# Patient Record
Sex: MALE | Race: ASIAN | NOT HISPANIC OR LATINO | URBAN - METROPOLITAN AREA
[De-identification: names, ages, dates, MRNs, and addresses within clinical notes are randomized per-mention and may not be internally consistent; named-entity substitution may affect disease eponyms.]

---

## 2022-07-26 ENCOUNTER — EMERGENCY (EMERGENCY)
Facility: HOSPITAL | Age: 37
LOS: 1 days | Discharge: HOME | End: 2022-07-26
Attending: STUDENT IN AN ORGANIZED HEALTH CARE EDUCATION/TRAINING PROGRAM | Admitting: EMERGENCY MEDICINE

## 2022-07-26 VITALS
TEMPERATURE: 98 F | SYSTOLIC BLOOD PRESSURE: 119 MMHG | DIASTOLIC BLOOD PRESSURE: 74 MMHG | RESPIRATION RATE: 14 BRPM | HEART RATE: 82 BPM | OXYGEN SATURATION: 100 %

## 2022-07-26 LAB
ALBUMIN SERPL ELPH-MCNC: 4.7 G/DL — SIGNIFICANT CHANGE UP (ref 3.3–5)
ALP SERPL-CCNC: 99 U/L — SIGNIFICANT CHANGE UP (ref 40–120)
ALT FLD-CCNC: 34 U/L — SIGNIFICANT CHANGE UP (ref 4–41)
ANION GAP SERPL CALC-SCNC: 11 MMOL/L — SIGNIFICANT CHANGE UP (ref 7–14)
APTT BLD: 30.6 SEC — SIGNIFICANT CHANGE UP (ref 27–36.3)
AST SERPL-CCNC: 25 U/L — SIGNIFICANT CHANGE UP (ref 4–40)
BASE EXCESS BLDV CALC-SCNC: 4.7 MMOL/L — HIGH (ref -2–3)
BASOPHILS # BLD AUTO: 0.06 K/UL — SIGNIFICANT CHANGE UP (ref 0–0.2)
BASOPHILS NFR BLD AUTO: 0.7 % — SIGNIFICANT CHANGE UP (ref 0–2)
BILIRUB SERPL-MCNC: 0.3 MG/DL — SIGNIFICANT CHANGE UP (ref 0.2–1.2)
BLOOD GAS VENOUS COMPREHENSIVE RESULT: SIGNIFICANT CHANGE UP
BUN SERPL-MCNC: 10 MG/DL — SIGNIFICANT CHANGE UP (ref 7–23)
CALCIUM SERPL-MCNC: 10.2 MG/DL — SIGNIFICANT CHANGE UP (ref 8.4–10.5)
CHLORIDE BLDV-SCNC: 104 MMOL/L — SIGNIFICANT CHANGE UP (ref 96–108)
CHLORIDE SERPL-SCNC: 102 MMOL/L — SIGNIFICANT CHANGE UP (ref 98–107)
CK SERPL-CCNC: 83 U/L — SIGNIFICANT CHANGE UP (ref 30–200)
CK SERPL-CCNC: 90 U/L — SIGNIFICANT CHANGE UP (ref 30–200)
CO2 BLDV-SCNC: 34.4 MMOL/L — HIGH (ref 22–26)
CO2 SERPL-SCNC: 29 MMOL/L — SIGNIFICANT CHANGE UP (ref 22–31)
CREAT SERPL-MCNC: 0.88 MG/DL — SIGNIFICANT CHANGE UP (ref 0.5–1.3)
D DIMER BLD IA.RAPID-MCNC: <150 NG/ML DDU — SIGNIFICANT CHANGE UP
EGFR: 112 ML/MIN/1.73M2 — SIGNIFICANT CHANGE UP
EOSINOPHIL # BLD AUTO: 0.19 K/UL — SIGNIFICANT CHANGE UP (ref 0–0.5)
EOSINOPHIL NFR BLD AUTO: 2.4 % — SIGNIFICANT CHANGE UP (ref 0–6)
FLUAV AG NPH QL: SIGNIFICANT CHANGE UP
FLUBV AG NPH QL: SIGNIFICANT CHANGE UP
GAS PNL BLDV: 136 MMOL/L — SIGNIFICANT CHANGE UP (ref 136–145)
GLUCOSE BLDV-MCNC: 74 MG/DL — SIGNIFICANT CHANGE UP (ref 70–99)
GLUCOSE SERPL-MCNC: 82 MG/DL — SIGNIFICANT CHANGE UP (ref 70–99)
HCO3 BLDV-SCNC: 33 MMOL/L — HIGH (ref 22–29)
HCT VFR BLD CALC: 54 % — HIGH (ref 39–50)
HCT VFR BLDA CALC: 52 % — HIGH (ref 39–51)
HGB BLD CALC-MCNC: 17.2 G/DL — HIGH (ref 13–17)
HGB BLD-MCNC: 17.6 G/DL — HIGH (ref 13–17)
IANC: 4.9 K/UL — SIGNIFICANT CHANGE UP (ref 1.8–7.4)
IMM GRANULOCYTES NFR BLD AUTO: 0.2 % — SIGNIFICANT CHANGE UP (ref 0–1.5)
INR BLD: 1.06 RATIO — SIGNIFICANT CHANGE UP (ref 0.88–1.16)
LACTATE BLDV-MCNC: 1.9 MMOL/L — SIGNIFICANT CHANGE UP (ref 0.5–2)
LYMPHOCYTES # BLD AUTO: 2.44 K/UL — SIGNIFICANT CHANGE UP (ref 1–3.3)
LYMPHOCYTES # BLD AUTO: 30.4 % — SIGNIFICANT CHANGE UP (ref 13–44)
MAGNESIUM SERPL-MCNC: 2.2 MG/DL — SIGNIFICANT CHANGE UP (ref 1.6–2.6)
MCHC RBC-ENTMCNC: 29 PG — SIGNIFICANT CHANGE UP (ref 27–34)
MCHC RBC-ENTMCNC: 32.6 GM/DL — SIGNIFICANT CHANGE UP (ref 32–36)
MCV RBC AUTO: 89.1 FL — SIGNIFICANT CHANGE UP (ref 80–100)
MONOCYTES # BLD AUTO: 0.42 K/UL — SIGNIFICANT CHANGE UP (ref 0–0.9)
MONOCYTES NFR BLD AUTO: 5.2 % — SIGNIFICANT CHANGE UP (ref 2–14)
NEUTROPHILS # BLD AUTO: 4.9 K/UL — SIGNIFICANT CHANGE UP (ref 1.8–7.4)
NEUTROPHILS NFR BLD AUTO: 61.1 % — SIGNIFICANT CHANGE UP (ref 43–77)
NRBC # BLD: 0 /100 WBCS — SIGNIFICANT CHANGE UP
NRBC # FLD: 0 K/UL — SIGNIFICANT CHANGE UP
NT-PROBNP SERPL-SCNC: <5 PG/ML — SIGNIFICANT CHANGE UP
PCO2 BLDV: 59 MMHG — HIGH (ref 42–55)
PH BLDV: 7.35 — SIGNIFICANT CHANGE UP (ref 7.32–7.43)
PLATELET # BLD AUTO: 264 K/UL — SIGNIFICANT CHANGE UP (ref 150–400)
PO2 BLDV: 31 MMHG — SIGNIFICANT CHANGE UP
POTASSIUM BLDV-SCNC: 4.5 MMOL/L — SIGNIFICANT CHANGE UP (ref 3.5–5.1)
POTASSIUM SERPL-MCNC: 4 MMOL/L — SIGNIFICANT CHANGE UP (ref 3.5–5.3)
POTASSIUM SERPL-SCNC: 4 MMOL/L — SIGNIFICANT CHANGE UP (ref 3.5–5.3)
PROT SERPL-MCNC: 7.8 G/DL — SIGNIFICANT CHANGE UP (ref 6–8.3)
PROTHROM AB SERPL-ACNC: 12.3 SEC — SIGNIFICANT CHANGE UP (ref 10.5–13.4)
RBC # BLD: 6.06 M/UL — HIGH (ref 4.2–5.8)
RBC # FLD: 12.6 % — SIGNIFICANT CHANGE UP (ref 10.3–14.5)
RSV RNA NPH QL NAA+NON-PROBE: SIGNIFICANT CHANGE UP
SAO2 % BLDV: 47.4 % — SIGNIFICANT CHANGE UP
SARS-COV-2 RNA SPEC QL NAA+PROBE: SIGNIFICANT CHANGE UP
SODIUM SERPL-SCNC: 142 MMOL/L — SIGNIFICANT CHANGE UP (ref 135–145)
TROPONIN T, HIGH SENSITIVITY RESULT: <6 NG/L — SIGNIFICANT CHANGE UP
VIT B12 SERPL-MCNC: 645 PG/ML — SIGNIFICANT CHANGE UP (ref 200–900)
WBC # BLD: 8.03 K/UL — SIGNIFICANT CHANGE UP (ref 3.8–10.5)
WBC # FLD AUTO: 8.03 K/UL — SIGNIFICANT CHANGE UP (ref 3.8–10.5)

## 2022-07-26 PROCEDURE — 93010 ELECTROCARDIOGRAM REPORT: CPT

## 2022-07-26 PROCEDURE — 70496 CT ANGIOGRAPHY HEAD: CPT | Mod: 26,MA

## 2022-07-26 PROCEDURE — 70498 CT ANGIOGRAPHY NECK: CPT | Mod: 26,MA

## 2022-07-26 PROCEDURE — 99218: CPT | Mod: 25

## 2022-07-26 PROCEDURE — 71045 X-RAY EXAM CHEST 1 VIEW: CPT | Mod: 26

## 2022-07-26 PROCEDURE — 73502 X-RAY EXAM HIP UNI 2-3 VIEWS: CPT | Mod: 26,RT

## 2022-07-26 PROCEDURE — 72170 X-RAY EXAM OF PELVIS: CPT | Mod: 26,59

## 2022-07-26 PROCEDURE — 72125 CT NECK SPINE W/O DYE: CPT | Mod: 26,MA

## 2022-07-26 RX ORDER — SODIUM CHLORIDE 9 MG/ML
1000 INJECTION INTRAMUSCULAR; INTRAVENOUS; SUBCUTANEOUS
Refills: 0 | Status: DISCONTINUED | OUTPATIENT
Start: 2022-07-26 | End: 2022-07-30

## 2022-07-26 RX ADMIN — SODIUM CHLORIDE 125 MILLILITER(S): 9 INJECTION INTRAMUSCULAR; INTRAVENOUS; SUBCUTANEOUS at 23:05

## 2022-07-26 NOTE — ED ADULT TRIAGE NOTE - CHIEF COMPLAINT QUOTE
Pt arrives ambulatory to triage c/o right foot cramping, SOB, and neck pain since last Saturday. Respirations even & unlabored. Pt appears comfortable. Denies PMH.

## 2022-07-26 NOTE — ED CDU PROVIDER INITIAL DAY NOTE - OBJECTIVE STATEMENT
this is a 37 y.o male with no known PMHx presented to the ED complaining of having right leg weakness since saturday, patient got into a argument with wife. Patient states that he started to have right right lower extremity weakness and numbness, he had difficulties walking along with some weaknewss and he felt as if he couldn't stand on his leg.

## 2022-07-26 NOTE — ED PROVIDER NOTE - CLINICAL SUMMARY MEDICAL DECISION MAKING FREE TEXT BOX
36 y/o M with no known medical problems p/w right leg weakness since sat. Pt states he was in an argument with wife. Pt states he was in an argument and during the event he had headache followed by neck pain and chest pain along with SOB which lasted for 2 hours.  right leg weakness post arugment, pt w/ abnormal strenght in rle . no saddle anesthesia or back pain, will obtain ct cta, cbc, cmp, ekg, trop, d dimer, neuro consult

## 2022-07-26 NOTE — ED CDU PROVIDER INITIAL DAY NOTE - ATTENDING APP SHARED VISIT CONTRIBUTION OF CARE
38 y/o M with no known medical problems p/w right leg weakness since sat. Pt states he was in an argument with wife. Pt states he was in an argument and during the event he had headache followed by neck pain and chest pain along with SOB which lasted for 2 hours during the event. After the event he states he developed cramping in b/l UE and LE followed by persitent weakness in the right leg. pt has been having difficulty with walking due to weakness in his right leg since the event. He feels like he cant stand on his leg well due to weakness and has not been able to walk with the leg straight. He denies chest pain, dizziness, nausea, vomiting. Pt w/ 4/5 strength RLE proximal and distal w/ mild right leg foot drop. CT head negative, seen by neuro placed in cdu for MRI brain and c/t/l spine   denies fever, chills, chest pain, SOB, abdominal pain, diarrhea, dysuria, syncope, bleeding, new rash,weakness, numbness, blurred vision    ROS  otherwise negative as per HPI  Gen: Awake, Alert, WD, WN, NAD  Head:  NC/AT  Eyes:  PERRL, EOMI, Conjunctiva pink, lids normal, no scleral icterus  ENT: OP clear, no exudates,  moist mucus membranes  Neck: supple, nontender, no meningismus, no JVD, trachea midline  Cardiac/CV:  S1 S2, RRR, no M/G/R  Respiratory/Pulm:  CTAB, good air movement, normal resp effort, no wheezes/stridor/retractions/rales/rhonchi  Gastrointestinal/Abdomen:  Soft, nontender, nondistended, +BS, no rebound/guarding  Back:  no CVAT, no MLT  Ext:  warm, well perfused, moving all extremities spontaneously, no peripheral edema, distal pulses intact  Skin: intact, no rash  Neuro:  AAOx3, sensation intact, motor 4/5 x RLE w/ proximal weakness and distal right 3/5 w/ foot drop  speech clear  cdu plan  MRI brain , MRI c/t/l spine w/w/o   neuro eval

## 2022-07-26 NOTE — ED PROVIDER NOTE - ATTENDING CONTRIBUTION TO CARE
Contacted pt and given appt for 4/13. attending wrote note  Dr. Diego: I have personally seen and examined this patient at the bedside. I have fully participated in the care of this patient. I have reviewed all pertinent clinical information, including history, physical exam, plan and the Resident's note and agree except as noted. HPI above as by me. PE above as by me. DDX PLAN

## 2022-07-26 NOTE — ED CDU PROVIDER INITIAL DAY NOTE - NS ED ATTENDING STATEMENT MOD
This was a shared visit with the PANKAJ. I reviewed and verified the documentation and independently performed the documented:

## 2022-07-26 NOTE — ED CDU PROVIDER INITIAL DAY NOTE - MEDICAL DECISION MAKING DETAILS
this is a 37 y.o male with no known PMHx presented to the ED complaining of having right leg weakness since saturday. leg weakness-MRI of head,cervical thoracic and lumbar spine, neurology consult. reassess

## 2022-07-26 NOTE — CONSULT NOTE ADULT - ATTENDING COMMENTS
patient seen and examined at bedside, CN intact, no sensory abnl, DTR equal and symmetric, plantars downgoing  poor effort on strength testing in RLE, lying in bed 3/5, leg drifts, but able to stand and bear weight without difficulty, dragging foot while walking  MRI brain  C/T/L spine with and without marco antonio - unremarkable  Likely conversion disorder, sudden onset of Sx after argumentation with family, unremarkable imaging and non consistent neuro exam  No further inpatient neuro w/u  f/u out pt Dr rothman  D/w patient and ER staff

## 2022-07-26 NOTE — CONSULT NOTE ADULT - SUBJECTIVE AND OBJECTIVE BOX
Neurology - Consult Note - 07-26-22  -  Xiomy Duenas MD  PGY-2 Neurology  Spectra: [id number] ([facility]), [id number] ([facility])  -    Name: MANSOOR MARR; 37y (1985)    Reason for Admission:     Chief Complaint:   HPI:          Review of Systems:    CONSTITUTIONAL: No fevers or chills  EYES/ENT: No visual changes or no throat pain   NECK: No pain or stiffness  RESPIRATORY: No hemoptysis or shortness of breath  CARDIOVASCULAR: No chest pain or palpitations. Patient does endorse palpitations from time to time.  GASTROINTESTINAL: No melena or hematochezia  GENITOURINARY: No dysuria or hematuria  NEUROLOGICAL: +As stated in HPI above  SKIN: No itching, burning, rashes, or lesions   All other review of systems is negative unless indicated above.    Allergies: NKDA  PMHx:   No pertinent past medical history. Patient hasnt been to doctor's for last 5 years.  PFHx: NKFH.  PSuHx: No significant past surgical history        Medications:  MEDICATIONS  (STANDING):    MEDICATIONS  (PRN):      Vitals:  T(C): 36.6 (07-26-22 @ 16:44), Max: 36.6 (07-26-22 @ 16:44)  HR: 82 (07-26-22 @ 16:44) (82 - 82)  BP: 119/74 (07-26-22 @ 16:44) (119/74 - 119/74)  RR: 14 (07-26-22 @ 16:44) (14 - 14)  SpO2: 100% (07-26-22 @ 16:44) (100% - 100%)    Physical Examination:     Constitutional: well-developed, well-nourished, well-groomed  Eyes: ophthalmoscopic exam deferred secondary to COVID-19 pandemic  Cardiovascular: no swelling, warm-to-touch    Neurological Examination:    - Mental Status: Alert, awake, oriented to person, place, and time; speech is fluent with intact naming, repetition, and follows 1-step and 3-step mid-line crossing commands; good overall fund of knowledge;     - Cranial Nerves:  II: Visual fields are full to confrontation; pupils are equal, round, and reactive to light   III, IV, VI: Extraocular movements are intact without nystagmus  V: Facial sensation is intact in the V1-V3 distribution bilaterally  VII: Face is symmetric with normal eye closure and smile  VIII: Hearing is intact to finger rub b/l  IX, X: Uvula is midline and soft palate rises symmetrically  XI: Shoulder shrug intact 5/5  XII: Tongue protrudes in the midline, no fasciculations    - Motor/Strength Testing:                                 Right           Left  Deltoid                     5                 5  Biceps                      5                 5  Triceps                     5                 5  Wrist Ext (radial)       5                 5  Hand                 5                 5    Hip Flex                   3                  5  Knee Ext	      3                  5  Dorsiflex                 3-                  5  Plantarflex               3-                  5    - There is no pronator drift b/l UE. RLE drift   - Normal muscle bulk and tone throughout.    - Reflexes:   Bicep (C5/C6):                  R 2+ --- L 2+   Brachioradialis (C5/C6) :   R 2+ --- L 2+   Patella (L3/L4) :                 R 2+ --- L 2+  R slightly less than L  Ankle (S1) :                       no clonus    - Plant responses down bilaterally.    - Sensory: Intact throughout to light touch, blunted on R shin, dorsal foot, plantar foot, compared to L  - Coordination: Finger-nose-finger intact without ataxia or dysmetria. HTS ok on L, limited by weakness on R.   - Gait: Cautious. R foot everted, R foot doesnt lift high.    Labs:                        17.6   8.03  )-----------( 264      ( 26 Jul 2022 18:20 )             54.0     07-26    142  |  102  |  10  ----------------------------<  82  4.0   |  29  |  0.88    Ca    10.2      26 Jul 2022 18:20  Mg     2.20     07-26    TPro  7.8  /  Alb  4.7  /  TBili  0.3  /  DBili  x   /  AST  25  /  ALT  34  /  AlkPhos  99  07-26    CAPILLARY BLOOD GLUCOSE        LIVER FUNCTIONS - ( 26 Jul 2022 18:20 )  Alb: 4.7 g/dL / Pro: 7.8 g/dL / ALK PHOS: 99 U/L / ALT: 34 U/L / AST: 25 U/L / GGT: x             PT/INR - ( 26 Jul 2022 18:20 )   PT: 12.3 sec;   INR: 1.06 ratio         PTT - ( 26 Jul 2022 18:20 )  PTT:30.6 sec  CSF:                  Radiology:     Neurology - Consult Note - 07-26-22      Name: MANSOOR MARR; 37y (1985)    Reason for Admission: R leg weakness    Chief Complaint: 36 yo M with R leg weakness    HPI: 36 yo M, no known PMH, presents to the ED for R leg "not working." Patient reports he got into a verbal argument with his family on Saturday, during which he suddenly experienced difficulty breathing, chest pain, abdominal pain. He also experienced neck pain and some Right hand stiffness. All of these above symptoms lasted about 2 hours before completely resolving, but patient then noticed weakness of the Right leg, which has since been continuous since Saturday. Patient denied any trauma or injury to the head, back, legs, or elsewhere. Patient denies any pain currently.Patient denies current headache, changes in vision changes in hearing or smell or taste, arm pain, chest pain, palpitations, dyspnea, abdominal pain, leg pain, nausea, vomiting, diarrhea, constipation, changes in urination or bowel habits.        Review of Systems:    CONSTITUTIONAL: No fevers or chills  EYES/ENT: No visual changes or no throat pain   NECK: No pain or stiffness  RESPIRATORY: No hemoptysis or shortness of breath  CARDIOVASCULAR: No chest pain or palpitations. Patient does endorse palpitations from time to time.  GASTROINTESTINAL: No melena or hematochezia  GENITOURINARY: No dysuria or hematuria  NEUROLOGICAL: +As stated in HPI above  SKIN: No itching, burning, rashes, or lesions   All other review of systems is negative unless indicated above.    Allergies: NKDA  PMHx: No Known PMH  Patient hasn't been to doctor's for last 5 years.  No recent travel history.  No recent hospitalizations.   Immunized for COVID x 3 doses    PFHx: NKFH.  PSuHx: No significant past surgical history  Social Hx: No smoking.        Medications:  MEDICATIONS  (STANDING):    MEDICATIONS  (PRN):      Vitals:  T(C): 36.6 (07-26-22 @ 16:44), Max: 36.6 (07-26-22 @ 16:44)  HR: 82 (07-26-22 @ 16:44) (82 - 82)  BP: 119/74 (07-26-22 @ 16:44) (119/74 - 119/74)  RR: 14 (07-26-22 @ 16:44) (14 - 14)  SpO2: 100% (07-26-22 @ 16:44) (100% - 100%)    Physical Examination:   Constitutional: well-developed, well-nourished, well-groomed  Eyes: ophthalmoscopic exam deferred secondary to COVID-19 pandemic  Cardiovascular: no swelling, warm-to-touch    Neurological Examination:    - Mental Status: Alert, awake, oriented to person, place, and time; speech is fluent with intact naming, repetition, and follows 1-step and 3-step mid-line crossing commands; good overall fund of knowledge;     - Cranial Nerves:  II: Visual fields are full to confrontation; pupils are equal, round, and reactive to light   III, IV, VI: Extraocular movements are intact without nystagmus  V: Facial sensation is intact in the V1-V3 distribution bilaterally  VII: Face is symmetric with normal eye closure and smile  VIII: Hearing is intact to finger rub b/l  IX, X: Uvula is midline and soft palate rises symmetrically  XI: Shoulder shrug intact 5/5  XII: Tongue protrudes in the midline, no fasciculations    - Motor/Strength Testing:                                 Right           Left  Deltoid                     5                 5  Biceps                      5                 5  Triceps                     5                 5  Wrist Ext (radial)       5                 5  Hand                 5                 5    Hip Flex                   3                  5  Knee Ext	      3                  5  Dorsiflex                 3-                  5  Patient cannot move against resistance on the R foot  Plantarflex               3-                  5  Patient cannot move against resistance on the R foot    - There is no pronator drift b/l UE. RLE drift   - Normal muscle bulk and tone throughout.    - Reflexes:   Bicep (C5/C6):                  R 2+ --- L 2+   Brachioradialis (C5/C6) :   R 2+ --- L 2+   Patella (L3/L4) :                 R 2+ --- L 2+  R slightly less than L  Ankle (S1) :                       no clonus    - Plant responses down bilaterally.    - Sensory: Intact throughout to light touch, blunted on R shin, dorsal foot, plantar foot, compared to L  - Coordination: Finger-nose-finger intact without ataxia or dysmetria. HTS ok on L, limited by weakness on R.   - Gait: Cautious. R foot everted, R foot leg/doesn't lift high, ?circumduction.     Labs:                        17.6   8.03  )-----------( 264      ( 26 Jul 2022 18:20 )             54.0     07-26    142  |  102  |  10  ----------------------------<  82  4.0   |  29  |  0.88    Ca    10.2      26 Jul 2022 18:20  Mg     2.20     07-26    TPro  7.8  /  Alb  4.7  /  TBili  0.3  /  DBili  x   /  AST  25  /  ALT  34  /  AlkPhos  99  07-26    CAPILLARY BLOOD GLUCOSE        LIVER FUNCTIONS - ( 26 Jul 2022 18:20 )  Alb: 4.7 g/dL / Pro: 7.8 g/dL / ALK PHOS: 99 U/L / ALT: 34 U/L / AST: 25 U/L / GGT: x             PT/INR - ( 26 Jul 2022 18:20 )   PT: 12.3 sec;   INR: 1.06 ratio         PTT - ( 26 Jul 2022 18:20 )  PTT:30.6 sec  CSF:                  Radiology:

## 2022-07-26 NOTE — ED ADULT NURSE NOTE - OBJECTIVE STATEMENT
pt ambulatory from home, accompanied by spouse for onset of right sided pain and weakness since saturday. pt reports most symptoms resolved except for right lower extremity weakness. pt equal strength to upper extremities. right lower extremity is weaker compared to left. pt denies parasthesias. no headache, blurred vision or aphasia at this time. right ac 20g inserted. labs drawn.

## 2022-07-26 NOTE — CONSULT NOTE ADULT - ASSESSMENT
Assessment: 36 yo M, no known PMH, presents to the ED for R leg "not working." Patient reports he got into a verbal argument with his family on Saturday, during which he suddenly experienced difficulty breathing, chest pain, abdominal pain. He also experienced neck pain and some Right hand stiffness. All of these above symptoms lasted about 2 hours before completely resolving, but patient then noticed weakness of the Right leg, which has since been continuous since Saturday. Patient denied any trauma or injury to the head, back, legs, or elsewhere. Patient denies any pain currently.Patient denies current headache, changes in vision changes in hearing or smell or taste, arm pain, chest pain, palpitations, dyspnea, abdominal pain, leg pain, nausea, vomiting, diarrhea, constipation, changes in urination or bowel habits.     Motor/Strength Testing  Patient cannot dorsiflex and plantarflex against resistance on the R foot. RLE drift, patient has decreased leg raise on R.  Patellar reflex: 2 b/l but R slightly less than L. Sensation intact throughout to light touch, but blunted on R shin, dorsal foot, plantar foot, compared to L. HTS ok on L, limited by weakness on R. - Gait: Cautious. R foot everted, R foot leg/doesn't lift high, ?circumduction.     No fractures on imaging.    Impression: Right lower extremity paresis, distal weaker than proximal, without pain/tenderness/paresthesias. Unclear etiology, will need further workup, likely peripheral but will need to evaluate for central/axial causes as well.    Recommendations:  -MRI head, MRI spine (cervical, thoracic, lumbar)  -PT/OT, splinting  -Recommend getting B12, folate, and CPK levels  -Further recommendations pending above workup    Patient will be seen by Neurology attending during AM rounds.  Recommendations not final until attending attestation.

## 2022-07-26 NOTE — ED PROVIDER NOTE - PHYSICAL EXAMINATION
Gen: Awake, Alert, WD, WN, NAD  Head:  NC/AT  Eyes:  PERRL, EOMI, Conjunctiva pink, lids normal, no scleral icterus  ENT: OP clear, no exudates,  moist mucus membranes  Neck: supple, nontender, no meningismus, no JVD, trachea midline  Cardiac/CV:  S1 S2, RRR, no M/G/R  Respiratory/Pulm:  CTAB, good air movement, normal resp effort, no wheezes/stridor/retractions/rales/rhonchi  Gastrointestinal/Abdomen:  Soft, nontender, nondistended, +BS, no rebound/guarding  Back:  no CVAT, no MLT  Ext:  warm, well perfused, moving all extremities spontaneously, no peripheral edema, distal pulses intact  Skin: intact, no rash  Neuro:  AAOx3, sensation intact, motor 4/5 x RLE w/ proximal weakness and distal right 3/5 w/ foot drop  speech clear

## 2022-07-26 NOTE — ED PROVIDER NOTE - OBJECTIVE STATEMENT
36 y/o M with no known medical problems p/w right leg weakness since sat. Pt states he was in an argument 38 y/o M with no known medical problems p/w right leg weakness since sat. Pt states he was in an argument with wife. Pt states he was in an argument and during the event he had headache followed by neck pain and chest pain along with SOB which lasted for 2 hours during the event. After the event he states he developed cramping in b/l UE and LE followed by persitent weakness in the right leg. pt has been having difficulty with walking due to weakness in his right leg since the event. He feels like he cant stand on his leg well due to weakness and has not been able to walk with the leg straight. He denies chest pain, dizziness, nausea, vomiting. Pt w/ 4/5 strength RLE proximal and distal w/ mild right leg foot drop

## 2022-07-27 VITALS
DIASTOLIC BLOOD PRESSURE: 84 MMHG | RESPIRATION RATE: 18 BRPM | HEART RATE: 75 BPM | SYSTOLIC BLOOD PRESSURE: 123 MMHG | TEMPERATURE: 98 F | OXYGEN SATURATION: 100 %

## 2022-07-27 LAB
APPEARANCE UR: CLEAR — SIGNIFICANT CHANGE UP
BILIRUB UR-MCNC: NEGATIVE — SIGNIFICANT CHANGE UP
COLOR SPEC: SIGNIFICANT CHANGE UP
DIFF PNL FLD: NEGATIVE — SIGNIFICANT CHANGE UP
FOLATE SERPL-MCNC: 10.9 NG/ML — SIGNIFICANT CHANGE UP (ref 3.1–17.5)
GLUCOSE UR QL: NEGATIVE — SIGNIFICANT CHANGE UP
KETONES UR-MCNC: NEGATIVE — SIGNIFICANT CHANGE UP
LEUKOCYTE ESTERASE UR-ACNC: NEGATIVE — SIGNIFICANT CHANGE UP
NITRITE UR-MCNC: NEGATIVE — SIGNIFICANT CHANGE UP
PH UR: 7 — SIGNIFICANT CHANGE UP (ref 5–8)
PROT UR-MCNC: ABNORMAL
RBC CASTS # UR COMP ASSIST: SIGNIFICANT CHANGE UP /HPF (ref 0–4)
SP GR SPEC: 1.05 — SIGNIFICANT CHANGE UP (ref 1–1.05)
T PALLIDUM AB TITR SER: NEGATIVE — SIGNIFICANT CHANGE UP
UROBILINOGEN FLD QL: SIGNIFICANT CHANGE UP
VIT B12 SERPL-MCNC: 599 PG/ML — SIGNIFICANT CHANGE UP (ref 200–900)
WBC UR QL: SIGNIFICANT CHANGE UP /HPF (ref 0–5)

## 2022-07-27 PROCEDURE — 72156 MRI NECK SPINE W/O & W/DYE: CPT | Mod: 26,MA

## 2022-07-27 PROCEDURE — 72158 MRI LUMBAR SPINE W/O & W/DYE: CPT | Mod: 26,MA

## 2022-07-27 PROCEDURE — 70553 MRI BRAIN STEM W/O & W/DYE: CPT | Mod: 26,MA

## 2022-07-27 PROCEDURE — 99283 EMERGENCY DEPT VISIT LOW MDM: CPT

## 2022-07-27 PROCEDURE — 72157 MRI CHEST SPINE W/O & W/DYE: CPT | Mod: 26,MA

## 2022-07-27 PROCEDURE — 99217: CPT

## 2022-07-27 NOTE — ED CDU PROVIDER DISPOSITION NOTE - CARE PROVIDER_API CALL
Mello Alfredo)  Neurology; Neurophysiology  [address] [place], [address]  [place] [address]  Phone: [phone]  Fax: [phone]  Follow Up Time:

## 2022-07-27 NOTE — ED CDU PROVIDER SUBSEQUENT DAY NOTE - NEUROLOGICAL SENSATION
motor 4/5 strength in the right upper and lower extremities, with 3/5 right leg. foot drop. weakness noted in the right lower extremity as well/present and normal in 4 extremities

## 2022-07-27 NOTE — ED CDU PROVIDER DISPOSITION NOTE - ATTENDING CONTRIBUTION TO CARE
Patient improving, still with right leg weakness, but able to ambulate, unclear eitiology, Neurology to follow patient and have outpatient EMG.   Okay for discharge

## 2022-07-27 NOTE — PHYSICAL THERAPY INITIAL EVALUATION ADULT - ADDITIONAL COMMENTS
Patient lives in private home with wife and children, 2 steps to enter and flight to bedroom/bathroom. Patient was independent in all ADL prior to admission. Patient was not using assistive device prior to admission.

## 2022-07-27 NOTE — PHYSICAL THERAPY INITIAL EVALUATION ADULT - ACTIVE RANGE OF MOTION EXAMINATION, REHAB EVAL
Patient unable to actively move right knee but passively able to sit up on edge of bed and get it to 90/bilateral upper extremity Active ROM was WFL (within functional limits)/Left LE Active ROM was WFL (within functional limits)

## 2022-07-27 NOTE — ED CDU PROVIDER DISPOSITION NOTE - NSFOLLOWUPINSTRUCTIONS_ED_ALL_ED_FT
Rest, stay hydrated, take all meds as previously prescribed, use walker to assist in walking as needed, Follow up with Dr Alfredo (Neurology) within 1 week for post hospital visit and further testing. Show your doctor and specialists all copies of labs given to you. Return for worsening symptoms, ex. fever, shortness of breath, chest pain, dizziness, worsening weakness etc. Please read all the patient handouts.

## 2022-07-27 NOTE — ED CDU PROVIDER DISPOSITION NOTE - CLINICAL COURSE
37 y.o male with no known PMHx presented to the ED complaining of having right leg weakness since saturday, patient got into a argument with wife. Patient states that he started to have right right lower extremity weakness and numbness, difficulties walking along with some weakness and he felt as if he couldn't stand on his leg. Pt evaluated in ED, he had unremarkable lab w/u including ddmer, trop, b12, folate, mag, chemistry, CK. Pt had CT c spine, cta head and neck which showed no acute pathology. Neuro evaluated pt and recommended CDU for MRI head w/wo and c/t/l spine w/wo. Pt notes mild improvement of his sxs overnight but still has some difficulty ambulating. MRI came back without any acute pathology, neuro re-evaluated pt, recs pt eval for potential dc with outpt f/u. PT evaluated pt, pt able to sleep on first floor, advised walker for assistance and cleared for dc home. Pt amenable to plan.

## 2022-07-27 NOTE — PHYSICAL THERAPY INITIAL EVALUATION ADULT - IMPAIRMENTS CONTRIBUTING TO GAIT DEVIATIONS, PT EVAL
Patient not bending right knee and lacking hip extension on right side/impaired balance/impaired postural control

## 2022-07-27 NOTE — ED CDU PROVIDER SUBSEQUENT DAY NOTE - ATTENDING APP SHARED VISIT CONTRIBUTION OF CARE
Patient examined,  ambulating but abnormal gait secondary to foot drop, sensation intact pulse intact. heart s1s2, lungs clear, abd soft nontender. Patient examined,  ambulating but abnormal gait secondary to foot drop, sensation intact pulse intact. heart s1s2, lungs clear, abd soft nontender. motor 3/5  dorsiflexion and plantar  flexion,  and extesion of big toe. otherwise neuro neg.

## 2022-07-27 NOTE — ED CDU PROVIDER DISPOSITION NOTE - PATIENT PORTAL LINK FT
You can access the FollowMyHealth Patient Portal offered by [facility] by registering at the following website: [email or web address]. By joining [employer]’s FollowMyHealth portal, you will also be able to view your health information using other applications (apps) compatible with our system.

## 2022-07-27 NOTE — PHYSICAL THERAPY INITIAL EVALUATION ADULT - PERTINENT HX OF CURRENT PROBLEM, REHAB EVAL
38 y/o M with no known medical problems p/w right leg weakness since sat. Pt states he was in an argument with wife. Pt states he was in an argument and during the event he had headache followed by neck pain and chest pain along with SOB which lasted for 2 hours during the event. After the event he states he developed cramping in b/l UE and LE followed by persitent weakness in the right leg.

## 2022-07-27 NOTE — PROVIDER CONTACT NOTE (OTHER) - ASSESSMENT
Received a call from URIEL Garcia, who states patient will be discharged from CDU today.  Physical Therapy evaluated patient and recommended outpatient PT services with a RW.  RW script obtained and sent to [facility] with auth accepted.  Liaison delivered RW to the bedside.   offered for RW education, patient adamantly declined.  Family will transport patient home. CM will remain available for any further needs.

## 2023-05-25 ENCOUNTER — EMERGENCY (EMERGENCY)
Facility: HOSPITAL | Age: 38
LOS: 1 days | Discharge: HOME | End: 2023-05-25
Attending: EMERGENCY MEDICINE | Admitting: EMERGENCY MEDICINE
Payer: MEDICAID

## 2023-05-25 VITALS
HEART RATE: 92 BPM | SYSTOLIC BLOOD PRESSURE: 125 MMHG | TEMPERATURE: 98 F | DIASTOLIC BLOOD PRESSURE: 89 MMHG | RESPIRATION RATE: 15 BRPM

## 2023-05-25 LAB
ALBUMIN SERPL ELPH-MCNC: 4.8 G/DL — SIGNIFICANT CHANGE UP (ref 3.3–5)
ALP SERPL-CCNC: 86 U/L — SIGNIFICANT CHANGE UP (ref 40–120)
ALT FLD-CCNC: 47 U/L — HIGH (ref 4–41)
ANION GAP SERPL CALC-SCNC: 12 MMOL/L — SIGNIFICANT CHANGE UP (ref 7–14)
APTT BLD: 32.6 SEC — SIGNIFICANT CHANGE UP (ref 27–36.3)
AST SERPL-CCNC: 40 U/L — SIGNIFICANT CHANGE UP (ref 4–40)
BASOPHILS # BLD AUTO: 0.06 K/UL — SIGNIFICANT CHANGE UP (ref 0–0.2)
BASOPHILS NFR BLD AUTO: 1 % — SIGNIFICANT CHANGE UP (ref 0–2)
BILIRUB SERPL-MCNC: 0.3 MG/DL — SIGNIFICANT CHANGE UP (ref 0.2–1.2)
BLD GP AB SCN SERPL QL: NEGATIVE — SIGNIFICANT CHANGE UP
BUN SERPL-MCNC: 10 MG/DL — SIGNIFICANT CHANGE UP (ref 7–23)
CALCIUM SERPL-MCNC: 10 MG/DL — SIGNIFICANT CHANGE UP (ref 8.4–10.5)
CHLORIDE SERPL-SCNC: 100 MMOL/L — SIGNIFICANT CHANGE UP (ref 98–107)
CO2 SERPL-SCNC: 26 MMOL/L — SIGNIFICANT CHANGE UP (ref 22–31)
CREAT SERPL-MCNC: 1.01 MG/DL — SIGNIFICANT CHANGE UP (ref 0.5–1.3)
EGFR: 96 ML/MIN/1.73M2 — SIGNIFICANT CHANGE UP
EOSINOPHIL # BLD AUTO: 0.16 K/UL — SIGNIFICANT CHANGE UP (ref 0–0.5)
EOSINOPHIL NFR BLD AUTO: 2.7 % — SIGNIFICANT CHANGE UP (ref 0–6)
GLUCOSE SERPL-MCNC: 83 MG/DL — SIGNIFICANT CHANGE UP (ref 70–99)
HCT VFR BLD CALC: 45.8 % — SIGNIFICANT CHANGE UP (ref 39–50)
HGB BLD-MCNC: 15.8 G/DL — SIGNIFICANT CHANGE UP (ref 13–17)
IANC: 3 K/UL — SIGNIFICANT CHANGE UP (ref 1.8–7.4)
IMM GRANULOCYTES NFR BLD AUTO: 0.2 % — SIGNIFICANT CHANGE UP (ref 0–0.9)
INR BLD: 1.03 RATIO — SIGNIFICANT CHANGE UP (ref 0.88–1.16)
LYMPHOCYTES # BLD AUTO: 2.36 K/UL — SIGNIFICANT CHANGE UP (ref 1–3.3)
LYMPHOCYTES # BLD AUTO: 39.4 % — SIGNIFICANT CHANGE UP (ref 13–44)
MCHC RBC-ENTMCNC: 28.9 PG — SIGNIFICANT CHANGE UP (ref 27–34)
MCHC RBC-ENTMCNC: 34.5 GM/DL — SIGNIFICANT CHANGE UP (ref 32–36)
MCV RBC AUTO: 83.9 FL — SIGNIFICANT CHANGE UP (ref 80–100)
MONOCYTES # BLD AUTO: 0.4 K/UL — SIGNIFICANT CHANGE UP (ref 0–0.9)
MONOCYTES NFR BLD AUTO: 6.7 % — SIGNIFICANT CHANGE UP (ref 2–14)
NEUTROPHILS # BLD AUTO: 3 K/UL — SIGNIFICANT CHANGE UP (ref 1.8–7.4)
NEUTROPHILS NFR BLD AUTO: 50 % — SIGNIFICANT CHANGE UP (ref 43–77)
NRBC # BLD: 0 /100 WBCS — SIGNIFICANT CHANGE UP (ref 0–0)
NRBC # FLD: 0 K/UL — SIGNIFICANT CHANGE UP (ref 0–0)
PLATELET # BLD AUTO: 273 K/UL — SIGNIFICANT CHANGE UP (ref 150–400)
POTASSIUM SERPL-MCNC: 4.2 MMOL/L — SIGNIFICANT CHANGE UP (ref 3.5–5.3)
POTASSIUM SERPL-SCNC: 4.2 MMOL/L — SIGNIFICANT CHANGE UP (ref 3.5–5.3)
PROT SERPL-MCNC: 7.6 G/DL — SIGNIFICANT CHANGE UP (ref 6–8.3)
PROTHROM AB SERPL-ACNC: 11.9 SEC — SIGNIFICANT CHANGE UP (ref 10.5–13.4)
RBC # BLD: 5.46 M/UL — SIGNIFICANT CHANGE UP (ref 4.2–5.8)
RBC # FLD: 12 % — SIGNIFICANT CHANGE UP (ref 10.3–14.5)
RH IG SCN BLD-IMP: POSITIVE — SIGNIFICANT CHANGE UP
SODIUM SERPL-SCNC: 138 MMOL/L — SIGNIFICANT CHANGE UP (ref 135–145)
WBC # BLD: 5.99 K/UL — SIGNIFICANT CHANGE UP (ref 3.8–10.5)
WBC # FLD AUTO: 5.99 K/UL — SIGNIFICANT CHANGE UP (ref 3.8–10.5)

## 2023-05-25 PROCEDURE — 99285 EMERGENCY DEPT VISIT HI MDM: CPT

## 2023-05-25 PROCEDURE — 73130 X-RAY EXAM OF HAND: CPT | Mod: 26,LT

## 2023-05-25 RX ORDER — TETANUS TOXOID, REDUCED DIPHTHERIA TOXOID AND ACELLULAR PERTUSSIS VACCINE, ADSORBED 5; 2.5; 8; 8; 2.5 [IU]/.5ML; [IU]/.5ML; UG/.5ML; UG/.5ML; UG/.5ML
0.5 SUSPENSION INTRAMUSCULAR ONCE
Refills: 0 | Status: COMPLETED | OUTPATIENT
Start: 2023-05-25 | End: 2023-05-25

## 2023-05-25 RX ORDER — CEPHALEXIN 500 MG
1 CAPSULE ORAL
Qty: 28 | Refills: 0
Start: 2023-05-25 | End: 2023-05-31

## 2023-05-25 RX ORDER — MORPHINE SULFATE 50 MG/1
4 CAPSULE, EXTENDED RELEASE ORAL ONCE
Refills: 0 | Status: DISCONTINUED | OUTPATIENT
Start: 2023-05-25 | End: 2023-05-25

## 2023-05-25 RX ORDER — CEFAZOLIN SODIUM 1 G
1000 VIAL (EA) INJECTION ONCE
Refills: 0 | Status: COMPLETED | OUTPATIENT
Start: 2023-05-25 | End: 2023-05-25

## 2023-05-25 RX ORDER — ACETAMINOPHEN 500 MG
1000 TABLET ORAL ONCE
Refills: 0 | Status: COMPLETED | OUTPATIENT
Start: 2023-05-25 | End: 2023-05-25

## 2023-05-25 RX ADMIN — Medication 100 MILLIGRAM(S): at 22:07

## 2023-05-25 RX ADMIN — MORPHINE SULFATE 4 MILLIGRAM(S): 50 CAPSULE, EXTENDED RELEASE ORAL at 22:08

## 2023-05-25 RX ADMIN — Medication 400 MILLIGRAM(S): at 22:06

## 2023-05-25 RX ADMIN — TETANUS TOXOID, REDUCED DIPHTHERIA TOXOID AND ACELLULAR PERTUSSIS VACCINE, ADSORBED 0.5 MILLILITER(S): 5; 2.5; 8; 8; 2.5 SUSPENSION INTRAMUSCULAR at 22:07

## 2023-05-25 NOTE — ED PROVIDER NOTE - CARE PROVIDER_API CALL
Bennett Mata  Plastic Surgery  [address], [address]  [place] [address]  Phone: [phone]  Fax: [phone]  Follow Up Time:

## 2023-05-25 NOTE — ED PROVIDER NOTE - PHYSICAL EXAMINATION
Attending/Pura: NAD; PERRL/EOMI, non-icterus, supple, no LINDSAY, no JVD, RRR, CTAB; Abd-soft, NT/ND, no HSM; no LE edema, A&Ox3, nonfocal; LUE hand-laceration just distal to both palmar aspect of 2nd and 3rd digits, +exposed bone; motor/sensory intact

## 2023-05-25 NOTE — ED ADULT NURSE NOTE - OBJECTIVE STATEMENT
Pt recievd to RM 4 AxOx4 s/p cutting finger on metal while cleaning up in back yard. L index and middle finger appear to be affected. Pt c/o pain in finger. Denies pmh. Bleeding is controlled, wrapped in gauze. Pt denies daily blood thinner usage.  R20G placed. Pending XR and hand surg consult.

## 2023-05-25 NOTE — ED PROVIDER NOTE - PATIENT PORTAL LINK FT
You can access the FollowMyHealth Patient Portal offered by [facility] by registering at the following website: [email or web address]. By joining [facility] [facility]’s FollowMyHealth portal, you will also be able to view your health information using other applications (apps) compatible with our system.

## 2023-05-25 NOTE — ED PROVIDER NOTE - CLINICAL SUMMARY MEDICAL DECISION MAKING FREE TEXT BOX
A/P 37 yo M s/p lac to LUE hand digits, exposed bone, exam noted for neurovasc intact  Plan: Irrigation wound, xray, IV Abx, analgesia, Tetanus update, Hand consult  No previous EMR to reviewed

## 2023-05-25 NOTE — ED PROVIDER NOTE - NSFOLLOWUPINSTRUCTIONS_ED_ALL_ED_FT
Please follow up with Dr. Mata in 10 to 14 days    Antibiotic: Keflex 500mg every 8 hours for 7 days    If you develop swelling, redness, discharge, fever return back to the emergency department     Keep wound cover, dry for the next 24 hours then soap and water

## 2023-05-25 NOTE — ED PROVIDER NOTE - OBJECTIVE STATEMENT
Attending/Pura: 37 yo M right hand dominant, w/ no sig PMHx s/p laceration with an electric saw to his left hand involving his 2nd/3rd digits ~one hour PTA. Pt controlled bleeding with a bandage.

## 2023-05-25 NOTE — ED PROVIDER NOTE - PROGRESS NOTE DETAILS
Pura: hand consults requested. Dr. Bennett Mata evaluated and performed lac repair. Pt had received a tetanus update, IV Abx, analgesia. Dispo plan reviewed with patient and his wife.

## 2023-05-26 PROBLEM — Z78.9 OTHER SPECIFIED HEALTH STATUS: Chronic | Status: ACTIVE | Noted: 2022-07-26

## 2025-05-21 NOTE — ED PROVIDER NOTE - NS ED ROS FT
Diagnostic wire removed. denies fever, chills, chest pain, SOB, abdominal pain, diarrhea, dysuria, syncope, bleeding, new rash,weakness, numbness, blurred vision    ROS  otherwise negative as per HPI